# Patient Record
Sex: MALE | Race: WHITE | NOT HISPANIC OR LATINO
[De-identification: names, ages, dates, MRNs, and addresses within clinical notes are randomized per-mention and may not be internally consistent; named-entity substitution may affect disease eponyms.]

---

## 2018-03-27 ENCOUNTER — RECORDS - HEALTHEAST (OUTPATIENT)
Dept: ADMINISTRATIVE | Facility: OTHER | Age: 48
End: 2018-03-27

## 2018-04-24 ENCOUNTER — HOSPITAL ENCOUNTER (OUTPATIENT)
Dept: RESPIRATORY THERAPY | Facility: CLINIC | Age: 48
Discharge: HOME | End: 2018-04-24

## 2018-04-30 ENCOUNTER — HOSPITAL ENCOUNTER (OUTPATIENT)
Dept: RESPIRATORY THERAPY | Facility: CLINIC | Age: 48
Discharge: HOME | End: 2018-04-30

## 2018-04-30 DIAGNOSIS — J44.9 OBSTRUCTIVE LUNG DISEASE (H): ICD-10-CM

## 2018-04-30 LAB — HGB BLD-MCNC: 15.9 G/DL (ref 14–18)

## 2019-07-16 ENCOUNTER — OFFICE VISIT - HEALTHEAST (OUTPATIENT)
Dept: FAMILY MEDICINE | Facility: CLINIC | Age: 49
End: 2019-07-16

## 2019-07-16 DIAGNOSIS — L03.119 CELLULITIS AND ABSCESS OF LEG: ICD-10-CM

## 2019-07-16 DIAGNOSIS — L02.419 CELLULITIS AND ABSCESS OF LEG: ICD-10-CM

## 2019-08-02 ENCOUNTER — COMMUNICATION - HEALTHEAST (OUTPATIENT)
Dept: SCHEDULING | Facility: CLINIC | Age: 49
End: 2019-08-02

## 2019-08-05 ENCOUNTER — OFFICE VISIT - HEALTHEAST (OUTPATIENT)
Dept: FAMILY MEDICINE | Facility: CLINIC | Age: 49
End: 2019-08-05

## 2019-08-05 DIAGNOSIS — L03.115 CELLULITIS OF RIGHT LOWER EXTREMITY: ICD-10-CM

## 2019-08-05 DIAGNOSIS — Z12.11 SCREEN FOR COLON CANCER: ICD-10-CM

## 2019-08-05 DIAGNOSIS — Z72.0 TOBACCO USE: ICD-10-CM

## 2019-08-05 DIAGNOSIS — F41.9 ANXIETY: ICD-10-CM

## 2019-08-05 ASSESSMENT — MIFFLIN-ST. JEOR: SCORE: 2067.47

## 2019-09-23 ENCOUNTER — COMMUNICATION - HEALTHEAST (OUTPATIENT)
Dept: SCHEDULING | Facility: CLINIC | Age: 49
End: 2019-09-23

## 2019-09-23 DIAGNOSIS — L03.115 CELLULITIS OF RIGHT LOWER EXTREMITY: ICD-10-CM

## 2019-10-01 ENCOUNTER — OFFICE VISIT - HEALTHEAST (OUTPATIENT)
Dept: FAMILY MEDICINE | Facility: CLINIC | Age: 49
End: 2019-10-01

## 2019-10-01 ENCOUNTER — COMMUNICATION - HEALTHEAST (OUTPATIENT)
Dept: FAMILY MEDICINE | Facility: CLINIC | Age: 49
End: 2019-10-01

## 2019-10-01 DIAGNOSIS — R06.2 WHEEZING: ICD-10-CM

## 2019-10-01 DIAGNOSIS — L03.115 CELLULITIS OF RIGHT LOWER EXTREMITY: ICD-10-CM

## 2019-10-01 DIAGNOSIS — Z23 NEED FOR TETANUS BOOSTER: ICD-10-CM

## 2019-10-01 DIAGNOSIS — F41.1 GENERALIZED ANXIETY DISORDER: ICD-10-CM

## 2019-10-01 LAB — WBC: 7.2 THOU/UL (ref 4–11)

## 2019-10-01 ASSESSMENT — PATIENT HEALTH QUESTIONNAIRE - PHQ9: SUM OF ALL RESPONSES TO PHQ QUESTIONS 1-9: 20

## 2019-10-01 ASSESSMENT — MIFFLIN-ST. JEOR: SCORE: 2052.04

## 2019-10-01 ASSESSMENT — ANXIETY QUESTIONNAIRES
1. FEELING NERVOUS, ANXIOUS, OR ON EDGE: NEARLY EVERY DAY
GAD7 TOTAL SCORE: 21
5. BEING SO RESTLESS THAT IT IS HARD TO SIT STILL: NEARLY EVERY DAY
4. TROUBLE RELAXING: NEARLY EVERY DAY
6. BECOMING EASILY ANNOYED OR IRRITABLE: NEARLY EVERY DAY
IF YOU CHECKED OFF ANY PROBLEMS ON THIS QUESTIONNAIRE, HOW DIFFICULT HAVE THESE PROBLEMS MADE IT FOR YOU TO DO YOUR WORK, TAKE CARE OF THINGS AT HOME, OR GET ALONG WITH OTHER PEOPLE: VERY DIFFICULT
3. WORRYING TOO MUCH ABOUT DIFFERENT THINGS: NEARLY EVERY DAY
7. FEELING AFRAID AS IF SOMETHING AWFUL MIGHT HAPPEN: NEARLY EVERY DAY
2. NOT BEING ABLE TO STOP OR CONTROL WORRYING: NEARLY EVERY DAY

## 2020-02-19 ENCOUNTER — COMMUNICATION - HEALTHEAST (OUTPATIENT)
Dept: ADMINISTRATIVE | Facility: CLINIC | Age: 50
End: 2020-02-19

## 2021-05-26 ASSESSMENT — PATIENT HEALTH QUESTIONNAIRE - PHQ9: SUM OF ALL RESPONSES TO PHQ QUESTIONS 1-9: 20

## 2021-05-28 ASSESSMENT — ANXIETY QUESTIONNAIRES: GAD7 TOTAL SCORE: 21

## 2021-05-31 NOTE — PROGRESS NOTES
1. Cellulitis of right lower extremity    This seems to be healing up nicely with the antibiotics that he was given.  It does seem that he has a bit of a propensity to this, so he will keep an eye on this and if it happens again he should come in sooner in the course of this so that he can get him on treatment earlier.  Otherwise he should do just fine with this.  He will let us know if it seems to be getting worse instead of better.    2. Anxiety    We spent much more over time today discussing this issue as it seems to be much more important to him.  He is open to trying some medication for this so we will try to do something that can help both of his issues with his anxiety as well as his tobacco use.  He will try some bupropion at 150 mg a day.  I gave him enough to get him through a couple of months here.  I like to see him back in about 4 weeks to make sure that he is improving and make sure he is not having any side effects or issues with it.  We can recheck his PHQ 9 and joselito 7 scores as well at that time.  He denies being suicidal or homicidal but he will let us know if that seems to come on or get worse.    - buPROPion (WELLBUTRIN XL) 150 MG 24 hr tablet; Take 1 tablet (150 mg total) by mouth daily.  Dispense: 90 tablet; Refill: 3    3. Tobacco use    See above, but hopefully the bupropion will also help with this issue and get him to at least cut down on his smoking if not stop altogether.    4. Screen for colon cancer    - Ambulatory referral for Colonoscopy      Subjective: 48-year-old male new patient to our clinic who is here today for an ER follow-up.  Please see the note from the ER from last week.  He was seen for cellulitis of the right shin.  He was given some IM antibiotics as well as some oral antibiotics to go home on.  He has done well, the swelling is gone down, and the redness is improving as well.  The skin is actually starting to shrink down a little bit and peel which is probably a good  sign.  He has had this couple of times in the past.  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    However, most of the time today we discussed his issues of anxiety and depression which became very obvious just in talking with him.  I did have him fill out the questionnaires and he has significantly high scores on both.  It seems that his anxiety may be a bit worse than his depression as he spends a lot of time ruminating and he does not sleep well because he is thinking about things that are going on in his life.  He states he is always been a bit of an anxious person but it is actually become more disabling in the last year or so not doing the things that he enjoys doing because of its anxiety and it really just seems to be taking over his life a bit.  He is not really been on any medication for this and is not seeking any counseling at this time.  He denies being suicidal at all.  He denies any delusions or hallucinations.    Patient is new patient to the clinic. Please see past medical history, surgical history, social history and family history, all of which were completed in their entirety today.    A comprehensive Review of Systems was performed, and other than the positives mentioned above, the ROS was negative.    Objective: Well-appearing male in no acute distress.  Vital signs as noted.  He does seem to be anxious here today.  Ears are clear eyes and nose are normal.  Chest clear to auscultation heart regular rate and rhythm.  The shin on the right leg is good which is a small residual area of dull redness but the calf is supple and in good color with no obvious sign of DVT.  The skin is peeling as if the leg is shrinking down to normal size.

## 2021-05-31 NOTE — TELEPHONE ENCOUNTER
RN triage   Call from pt girlfriend -- no signed consent in chart --   Spoke w/ pt -- pt states he is calling back to ED -- was there last month for leg cellulitis -- and saw missed calls on [facility] phone [facility] --   Pt does not know who called him or what he was called about --  No record in chart of call/message left   Pt thought maybe about lab results-- lab results from ED visit are not released   Update re: cellulitis --   Pt still taking keflex and doxycycline -- did miss some doses and should complete both meds in the next 2 days   Pt states leg is much better -- and skin is peeling now  Leg will still swell some when standing too long -- swell goes down overnight   Still some redness on leg --- no fever   Pt states he has a number of health concerns --   Pt will keep appt scheduled 8/6 -- for ED f/u-- and discuss concerns w/ provider and pt will consider establishing a PCP to f/u on health concerns   Fátima Chapa RN [facility] RN triage      Reason for Disposition    Information only question and nurse able to answer    Protocols used: NO PROTOCOL AVAILABLE - INFORMATION ONLY-A-OH

## 2021-06-01 NOTE — TELEPHONE ENCOUNTER
We start at the dose that you were on before, which was 150.    In a month, if you aren't fully better, we will put it up to 300 then.    TS

## 2021-06-01 NOTE — TELEPHONE ENCOUNTER
Question following Office Visit  When did you see your provider: today  What is your question: I have questions regarding the following medication:  buPROPion (WELLBUTRIN XL) 150 MG 24 hr tablet 90 tablet 3 10/1/2019     Sig - Route: Take 1 tablet (150 mg total) by mouth daily. - Oral    Sent to pharmacy as: buPROPion HCl  mg 24 hr tablet, extended release (WELLBUTRIN XL      The pharmacy stated its to soon to fill this but I thought the dose was going to be increased. Please return call to discuss with patient.  Okay to leave a detailed message: Yes

## 2021-06-01 NOTE — PROGRESS NOTES
"ASSESSMENT:  1. Cellulitis of right lower extremity    These antibiotic that he is already started for this is doing quite well, as he states the cellulitis is improved, the color is better and the swelling is gone down.  His white count is also normal.  I think that this will continue to get better and he will continue to take the antibiotics until they are gone and follow-up if is not in coming back.    - White Blood Count (WBC)  - [facility] RED    2. Generalized anxiety disorder    I think the rest of his symptoms really are more associated with his anxiety.  He certainly does have some wheezing walking but we talked about anxiety today for quite a long time.  His girlfriend is here with and is very concerned about him as far as anxiety and depression go we did do a joselito 7 score which was very high.  He stopped taking the bupropion because it \"did not work\", but it turns out it only took it for a couple of days and then gave up on it.  We did discuss how the medication works and how he needs to take it for the time prescribed and that he should not see any effect of heart at all for a couple of weeks.  I restarted the mid to 150 mg a day that we can see him back in a month and see how that is working for him he may need to go up to 300 mg a day if it is not effective.    We also discussed how this could possibly help his smoking and that would be a good thing as well.      - buPROPion (WELLBUTRIN XL) 150 MG 24 hr tablet; Take 1 tablet (150 mg total) by mouth daily.  Dispense: 90 tablet; Refill: 3    3. Wheezing    I did give him an albuterol inhaler with instructions on its usage.  He should try to use it with a spacer when he can.  He should also quit smoking which is obvious.  He is at a hard time with this and he did not tolerate the Chantix that was tried a while ago.  I would imagine that the albuterol will help him but he will need to quit smoking and so that he does not have to use it too much.  We will follow-up " with this also in a month to see how that is working out for him.      - albuterol (PROAIR HFA;PROVENTIL HFA;VENTOLIN HFA) 90 mcg/actuation inhaler; Inhale 2 puffs every 6 (six) hours as needed for wheezing.  Dispense: 18 g; Refill: 5    4. Need for tetanus booster    - Td, Preservative Free (green label)          PLAN:  There are no Patient Instructions on file for this visit.    Orders Placed This Encounter   Procedures     Td, Preservative Free (green label)     White Blood Count (WBC)     [facility] RED     Medications Discontinued During This Encounter   Medication Reason     buPROPion (WELLBUTRIN XL) 150 MG 24 hr tablet Reorder       No follow-ups on file.    CHIEF COMPLAINT:  Chief Complaint   Patient presents with     Cough     Feels very weak last couple days, Productive cough, weak, nose plugged last 2 years, Congested, Has cellulitis on Rt shin       HISTORY OF PRESENT ILLNESS:  Rio is a 48 y.o. male presenting to the clinic today for a follow-up.  He did see me once before with this recurrent cellulitis in his lower extremity.  We did call in an antibiotic for him last week when it seemed to be acting up and that is doing actually much better.  He reports that the cellulitis is gone down and the color is better and overall the swelling is down and feels much better.    Unfortunately, he has a collection no other symptoms which I think are most likely related to smoking and/or anxiety.  He states that over the last couple of days he has had more headache and body aches and is not feeling well.  He will get dizzy at times.  He also is concerned because of shortness of breath that he has had with activity but he does not get any chest pain or anginal type equivalent symptoms from it.  He thinks is from his smoking but he is very anxious and nervous that there is something else going on as well giving him the symptoms.  He has had some sinus pressure and nasal drainage down the back of his throat.  He has had  "no fevers or chills as mentioned.    He was on some Wellbutrin but again as above, he stopped it after a couple of days because I did not think that it was working.  He is willing to go back on it at this time however.    He also tried Chantix at one point but did not tolerate it because it made him go \"crazy\".    REVIEW OF SYSTEMS:     All other systems are negative.    PFSH:    Reviewed      TOBACCO USE:  Social History     Tobacco Use   Smoking Status Current Every Day Smoker     Packs/day: 1.50     Years: 35.00     Pack years: 52.50   Smokeless Tobacco Never Used       VITALS:  Vitals:    10/01/19 1127   BP: 100/76   Patient Site: Left Arm   Patient Position: Sitting   Cuff Size: Adult Large   Pulse: 98   Temp: 98.1  F (36.7  C)   SpO2: 97%   Weight: (!) 264 lb 8 oz (120 kg)   Height: 5' 9.75\" (1.772 m)     Wt Readings from Last 3 Encounters:   10/01/19 (!) 264 lb 8 oz (120 kg)   08/05/19 (!) 267 lb 14.4 oz (121.5 kg)   07/17/19 (!) 262 lb 11.2 oz (119.2 kg)     Body mass index is 38.22 kg/m .    PHYSICAL EXAM:  Constitutional: Anxious appearing patient in no acute distress.  Vitals:  Per nursing notes.    HEENT:  Normocephalic, atraumatic.  Ears are clear bilaterally, with no fluid or redness, and landmarks visible.  Pupils are equal and reactive to light, extraocular muscles intact, visual fields are full.  Nose is normal, and oropharynx is clear without redness.    Neck is without lymphadenopathy.    Lungs:  Clear show some wheezing bilaterally but no rales or rhonchi heard.  Cardiac:  Regular rate and rhythm without murmurs, rubs, or gallops.     Leg on the right shows healing cellulitis with good color..  Palpation of the distal pulses are normal and symmetric.    Neurologic:  Cranial nerves II-XII intact.   Normal and symmetric reflexes in extremities, with normal strength and sensation.  Psychiatric: He is very anxious today and is very tangential in his thoughts and somewhat negative.    How difficult " did these problems make it for you to do your work, take care of things at home or get along with other people? : Very difficult (10/1/2019 12:00 PM)  Feeling nervous, anxious, or on edge: 3 (10/1/2019 12:00 PM)  Not being able to stop or control worrying: 3 (10/1/2019 12:00 PM)  Worrying too much about different things: 3 (10/1/2019 12:00 PM)  Trouble relaxing: 3 (10/1/2019 12:00 PM)  Being so restless that it's hard to sit still: 3 (10/1/2019 12:00 PM)  Becoming easily annoyed or irritable: 3 (10/1/2019 12:00 PM)  Feeling afraid as if something awful might happen: 3 (10/1/2019 12:00 PM)  MELISA 7 Total Score: 21 (10/1/2019 12:00 PM)  How difficult did these problems make it for you to do your work, take care of things at home or get along with other people? : Very difficult (10/1/2019 12:00 PM)    Little interest or pleasure in doing things: Nearly every day  Feeling down, depressed, or hopeless: Nearly every day  Trouble falling or staying asleep, or sleeping too much: Nearly every day  Feeling tired or having little energy: Nearly every day  Poor appetite or overeating: Nearly every day  Feeling bad about yourself - or that you are a failure or have let yourself or your family down: Nearly every day  Trouble concentrating on things, such as reading the newspaper or watching television: More than half the days  Moving or speaking so slowly that other people could have noticed. Or the opposite - being so fidgety or restless that you have been moving around a lot more than usual: Not at all  Thoughts that you would be better off dead, or of hurting yourself in some way: Not at all  PHQ-9 Total Score: 20  If you checked off any problems, how difficult have these problems made it for you to do your work, take care of things at home, or get along with other people?: Very difficult          The visit lasted a total of 25 minutes face to face with the patient. Over 50% of the time was spent counseling and educating the  patient about medications, medication adjustments, medication side effects, and lab testing.        MEDICATIONS:  Current Outpatient Medications   Medication Sig Dispense Refill     acetaminophen (TYLENOL) 500 MG tablet Take 1,000 mg by mouth every morning.       cephalexin (KEFLEX) 500 MG capsule Take 1 capsule (500 mg total) by mouth 4 (four) times a day for 10 days. 40 capsule 0     clotrimazole (LOTRIMIN AF, CLOTRIMAZOLE,) 1 % cream Apply topically 2 (two) times a day. Apply to feet. 30 g 0     albuterol (PROAIR HFA;PROVENTIL HFA;VENTOLIN HFA) 90 mcg/actuation inhaler Inhale 2 puffs every 6 (six) hours as needed for wheezing. 18 g 5     buPROPion (WELLBUTRIN XL) 150 MG 24 hr tablet Take 1 tablet (150 mg total) by mouth daily. 90 tablet 3     No current facility-administered medications for this visit.

## 2021-06-01 NOTE — TELEPHONE ENCOUNTER
Called Rio that antibiotic was sent to the pharmacy.  Rio is wondering what to do about work.  He said he is miserable, bones ache and area is spreading on shin.  At what point is it okay to go back to work. Oly Hodges LPN

## 2021-06-01 NOTE — TELEPHONE ENCOUNTER
Patient Returning Call  Reason for call:  Status of message left this morning.  Information relayed to patient:  Advised the patient the message is open,, and would put notice of callback on original message.Patient is anxious to hear back  please.  Patient has additional questions:  No  If YES, what are your questions/concerns:  NA  Okay to leave a detailed message?: Yes

## 2021-06-01 NOTE — TELEPHONE ENCOUNTER
"Girlfriend  - Verena     OK to talk to her         CC:  \"Cellulitis is back\"      R shin  - similar as before       Started this am      \"Chills\" - unsure if ever      R shin just red - no swelling     Not above the knee  - nothing to the calf area       At home is taking IBU for pain / achiness        Most recent note     \"Cellulitis of right lower extremity     This seems to be healing up nicely with the antibiotics that he was given.  It does seem that he has a bit of a propensity to this, so he will keep an eye on this and if it happens again he should come in sooner in the course of this so that he can get him on treatment earlier.  Otherwise he should do just fine with this.  He will let us know if it seems to be getting worse instead of better.\"       A/P:   > I will message provider to see about ABX vs appt for him      Confirmed pharmacy on file   [facility]   Pt tele# [phone]      Alvarado Duarte, RN   Triage and Medication Refills        Reason for Disposition    Fever and localized rash is very painful    Protocols used: RASH OR REDNESS - [id number]      "

## 2021-06-01 NOTE — TELEPHONE ENCOUNTER
Called advise to Rio on waiting to go to work when he is feeling better and not having fever and chills.  Oly Hodges LPN

## 2021-06-03 VITALS
WEIGHT: 264.5 LBS | HEIGHT: 70 IN | BODY MASS INDEX: 37.87 KG/M2 | HEART RATE: 98 BPM | TEMPERATURE: 98.1 F | SYSTOLIC BLOOD PRESSURE: 100 MMHG | DIASTOLIC BLOOD PRESSURE: 76 MMHG | OXYGEN SATURATION: 97 %

## 2021-06-03 VITALS — WEIGHT: 267.9 LBS | HEIGHT: 70 IN | BODY MASS INDEX: 38.35 KG/M2

## 2021-06-03 VITALS — WEIGHT: 261.2 LBS | BODY MASS INDEX: 36.43 KG/M2

## 2021-06-17 NOTE — PROGRESS NOTES
RESPIRATORY CARE NOTE     Patient Name: Rio Paul  Today's Date: 4/30/2018     Complete PFT done. Pt performed tests with good effort. Test results meet ATS criteria. Results scanned into epic. Pt left in no distress.       Mare Steinberg RRT

## 2021-06-17 NOTE — PATIENT INSTRUCTIONS - HE
Patient Instructions by Chris Coulter PA-C at 7/16/2019  1:30 PM     Author: Chris Coulter PA-C Service: -- Author Type: Physician Assistant    Filed: 7/16/2019  2:00 PM Encounter Date: 7/16/2019 Status: Addendum    : Chris Coulter PA-C (Physician Assistant)    Related Notes: Original Note by Chris Coulter PA-C (Physician Assistant) filed at 7/16/2019  2:00 PM       Present to the emergency room for definitive evaluation and treatment.  There they may treat you with IV antibiotics and further evaluation.      Patient Education     Cellulitis  Cellulitis is an infection of the deep layers of skin. A break in the skin, such as a cut or scratch, can let bacteria under the skin. If the bacteria get to deep layers of the skin, it can be serious. If not treated, cellulitis can get into the bloodstream and lymph nodes. The infection can then spread throughout the body. This causes serious illness.  Cellulitis causes the affected skin to become red, swollen, warm, and sore. The reddened areas have a visible border. An open sore may leak fluid (pus). You may have a fever, chills, and pain.  Cellulitis is treated with antibiotics taken for 7 to 10 days. An open sore may be cleaned and covered with cool wet gauze. Symptoms should get better 1 to 2 days after treatment is started. Make sure to take all the antibiotics for the full number of days until they are gone. Keep taking the medicine even if your symptoms go away.  Home care  Follow these tips:    Limit the use of the part of your body with cellulitis.     If the infection is on your leg, keep your leg raised while sitting. This will help to reduce swelling.    Take all of the antibiotic medicine exactly as directed until it is gone. Do not miss any doses, especially during the first 7 days. Dont stop taking the medicine when your symptoms get better.    Keep the affected area clean and dry.    Wash your hands with soap and warm water before and after touching your  skin. Anyone else who touches your skin should also wash his or her hands. Don't share towels.  Follow-up care  Follow up with your healthcare provider, or as advised. If your infection does not go away on the first antibiotic, your healthcare provider will prescribe a different one.  When to seek medical advice  Call your healthcare provider right away if any of these occur:    Red areas that spread    Swelling or pain that gets worse    Fluid leaking from the skin (pus)    Fever higher of 100.4  F (38.0  C) or higher after 2 days on antibiotics  Date Last Reviewed: 9/1/2016 2000-2017 [employer]. [address] [place] [address]. All rights reserved. This information is not intended as a substitute for professional medical care. Always follow your healthcare professional's instructions.           Patient Education     Discharge Instructions for Cellulitis  You have been diagnosed with cellulitis. This is an infection in the deepest layer of the skin. In some cases, the infection also affects the muscle. Cellulitis is caused by bacteria. The bacteria can enter the body through broken skin. This can happen with a cut, scratch, animal bite, or an insect bite that has been scratched. You may have been treated in the hospital with antibiotics and fluids. You will likely be given a prescription for antibiotics to take at home. This sheet will help you take care of yourself at home.  Home care  When you are home:    Take the prescribed antibiotic medicine you are given as directed until it is gone. Take it even if you feel better. It treats the infection and stops it from returning. Not taking all the medicine can make future infections hard to treat.    Keep the infected area clean.    When possible, raise the infected area above the level of your heart. This helps keep swelling down.    Talk with your healthcare provider if you are in pain. Ask what kind of over-the-counter medicine you can  take for pain.    Apply clean bandages as advised.    Take your temperature once a day for a week.    Wash your hands often to prevent spreading the infection.  In the future, wash your hands before and after you touch cuts, scratches, or bandages. This will help prevent infection.   When to call your healthcare provider  Call your healthcare provider immediately if you have any of the following:    Difficulty or pain when moving the joints above or below the infected area    Discharge or pus draining from the area    Fever of 100.4 F (38 C) or higher, or as directed by your healthcare provider    Pain that gets worse in or around the infected     Redness that gets worse in or around the infected area, particularly if the area of redness expands to a wider area    Shaking chills    Swelling of the infected area    Vomiting   Date Last Reviewed: 8/1/2016 2000-2017 [employer]. [address] [place] [address], [place] [address]. All rights reserved. This information is not intended as a substitute for professional medical care. Always follow your healthcare professional's instructions.

## 2021-06-20 NOTE — LETTER
Letter by Bridget Alejandro at      Author: Bridget Alejandro Service: -- Author Type: --    Filed:  Encounter Date: 2/19/2020 Status: (Other)         Rio Paul  [address]  [facility] [address]           02/19/20       Dear Rio:      At [facility], we care about your health and well-being.  Your primary care provider is committed to ensuring you receive high quality care and has chosen a network of specialists to assist in providing that care.  Recently Dr. Gabriel Veliz referred you to [facility] for a screening colonoscopy.    It is important to your overall health to follow through with the referral from your care provider.  Please call [facility] [phone] at your earliest convenience for assistance in scheduling an appointment with the recommended specialist.  If you have already scheduled an appointment, please disregard this notice.  Thank you for choosing the [facility] for your healthcare needs.      Sincerely,        Electronically signed by Bridget Alejandro      Scheduling/Referral Coordinator   [facility]

## 2021-06-27 NOTE — PROGRESS NOTES
Progress Notes by Chris Coulter PA-C at 7/16/2019  1:30 PM     Author: Chris Coulter PA-C Service: -- Author Type: Physician Assistant    Filed: 7/16/2019  5:01 PM Encounter Date: 7/16/2019 Status: Signed    : Chris Coulter PA-C (Physician Assistant)       Subjective:      Patient ID: Rio Paul is a 48 y.o. male.    Chief Complaint:    HPI  Rio Paul is a 48 y.o. male who who smokes a 1-1/2 to 2 packs/day who presents today complaining of an infection of the right pretibial area of his right lower extremity.  Patient recounts past medical history for having injured the area while cleaning his garage.  He does not complain of pain into the bone or an injury to the bone but rather a small bump or neck to the skin that caused some redness that started yesterday.  Over the last 48 hours the area of redness has increased markedly.  Patient shares last night that he had fever chills night sweats and fatigue.  He was having difficulty with staying warm last night.  He denies any immunocompromise status to include no type 2 diabetes.,  No alcohol use no kidney disease.    Patient has not tried any treatment for this at home.    History reviewed. No pertinent past medical history.    History reviewed. No pertinent surgical history.    No family history on file.    Social History     Tobacco Use   ? Smoking status: Current Every Day Smoker     Packs/day: 1.50     Years: 35.00     Pack years: 52.50   ? Smokeless tobacco: Never Used   Substance Use Topics   ? Alcohol use: No   ? Drug use: Not on file       Review of Systems  As above in HPI, otherwise balance of Review of Systems are negative.    Objective:     /70 (Patient Site: Right Arm, Patient Position: Sitting, Cuff Size: Adult Large)   Pulse (!) 113   Temp 99.8  F (37.7  C) (Oral)   Resp 24   Wt (!) 261 lb 3.2 oz (118.5 kg)   SpO2 97%   BMI 36.43 kg/m      Physical Exam  General: Patient is resting comfortably no acute distress is  afebrile  HEENT: Head is normocephalic atraumatic   eyes are PERRL EOMI sclera anicteric   LUNGS: Clear to auscultation bilaterally tachypneic at 24 respirations per minute  HEART: Tachycardic at 113 bpm   Skin: Without rash non-diaphoretic  Musculoskeletal: On the right lower pretibial area the outline of the cellulitis was marked.  He has a 21 cm area of diameter of erythema.  It is warm to touch and tender to palpation.      Assessment:     Procedures    The encounter diagnosis was Cellulitis and abscess of leg.    Plan:     1. Cellulitis and abscess of leg         I am concerned that the patient had systemic symptoms to include fever chills and night sweats last night.  He has fatigue today.  He is tachycardic and tachypneic so I am sending him to the emergency room for evaluation and treatment.  He may need IV antibiotics and some observation to make sure he is not septic.  Patient will have his wife drive him the short distance to the emergency room.  I called and gave report to the emergency room physician.  Questions were answered to patient's satisfaction before discharge.    Patient Instructions     Present to the emergency room for definitive evaluation and treatment.  There they may treat you with IV antibiotics and further evaluation.      Patient Education     Cellulitis  Cellulitis is an infection of the deep layers of skin. A break in the skin, such as a cut or scratch, can let bacteria under the skin. If the bacteria get to deep layers of the skin, it can be serious. If not treated, cellulitis can get into the bloodstream and lymph nodes. The infection can then spread throughout the body. This causes serious illness.  Cellulitis causes the affected skin to become red, swollen, warm, and sore. The reddened areas have a visible border. An open sore may leak fluid (pus). You may have a fever, chills, and pain.  Cellulitis is treated with antibiotics taken for 7 to 10 days. An open sore may be cleaned  and covered with cool wet gauze. Symptoms should get better 1 to 2 days after treatment is started. Make sure to take all the antibiotics for the full number of days until they are gone. Keep taking the medicine even if your symptoms go away.  Home care  Follow these tips:    Limit the use of the part of your body with cellulitis.     If the infection is on your leg, keep your leg raised while sitting. This will help to reduce swelling.    Take all of the antibiotic medicine exactly as directed until it is gone. Do not miss any doses, especially during the first 7 days. Dont stop taking the medicine when your symptoms get better.    Keep the affected area clean and dry.    Wash your hands with soap and warm water before and after touching your skin. Anyone else who touches your skin should also wash his or her hands. Don't share towels.  Follow-up care  Follow up with your healthcare provider, or as advised. If your infection does not go away on the first antibiotic, your healthcare provider will prescribe a different one.  When to seek medical advice  Call your healthcare provider right away if any of these occur:    Red areas that spread    Swelling or pain that gets worse    Fluid leaking from the skin (pus)    Fever higher of 100.4  F (38.0  C) or higher after 2 days on antibiotics  Date Last Reviewed: 9/1/2016 2000-2017 [employer]. [address] [place] [address], [place] [address]. All rights reserved. This information is not intended as a substitute for professional medical care. Always follow your healthcare professional's instructions.           Patient Education     Discharge Instructions for Cellulitis  You have been diagnosed with cellulitis. This is an infection in the deepest layer of the skin. In some cases, the infection also affects the muscle. Cellulitis is caused by bacteria. The bacteria can enter the body through broken skin. This can happen with a cut, scratch, animal bite, or an  insect bite that has been scratched. You may have been treated in the hospital with antibiotics and fluids. You will likely be given a prescription for antibiotics to take at home. This sheet will help you take care of yourself at home.  Home care  When you are home:    Take the prescribed antibiotic medicine you are given as directed until it is gone. Take it even if you feel better. It treats the infection and stops it from returning. Not taking all the medicine can make future infections hard to treat.    Keep the infected area clean.    When possible, raise the infected area above the level of your heart. This helps keep swelling down.    Talk with your healthcare provider if you are in pain. Ask what kind of over-the-counter medicine you can take for pain.    Apply clean bandages as advised.    Take your temperature once a day for a week.    Wash your hands often to prevent spreading the infection.  In the future, wash your hands before and after you touch cuts, scratches, or bandages. This will help prevent infection.   When to call your healthcare provider  Call your healthcare provider immediately if you have any of the following:    Difficulty or pain when moving the joints above or below the infected area    Discharge or pus draining from the area    Fever of 100.4 F (38 C) or higher, or as directed by your healthcare provider    Pain that gets worse in or around the infected     Redness that gets worse in or around the infected area, particularly if the area of redness expands to a wider area    Shaking chills    Swelling of the infected area    Vomiting   Date Last Reviewed: 8/1/2016 2000-2017 [employer]. [address] [place] [address], [place] [address]. All rights reserved. This information is not intended as a substitute for professional medical care. Always follow your healthcare professional's instructions.

## 2025-06-18 ENCOUNTER — NURSE TRIAGE (OUTPATIENT)
Dept: FAMILY MEDICINE | Facility: CLINIC | Age: 55
End: 2025-06-18

## 2025-06-18 NOTE — TELEPHONE ENCOUNTER
"Reason for Disposition   MODERATE pain (e.g., interferes with normal activities) and present > 3 days    Additional Information   Negative: Shock suspected (e.g., cold/pale/clammy skin, too weak to stand, low BP, rapid pulse)   Negative: Similar pain previously and it was from 'heart attack'   Negative: Similar pain previously and it was from 'angina' and not relieved by nitroglycerin   Negative: Sounds like a life-threatening emergency to the triager   Negative: Chest pain   Negative: Followed an injury to shoulder   Negative: Difficulty breathing or unusual sweating (e.g., sweating without exertion)   Negative: Pain lasting > 5 minutes and pain also present in chest  (Exception: Pain is clearly made worse by movement.)   Negative: Age > 40 and no obvious cause and pain even when not moving the arm  (Exception: Pain is clearly made worse by moving arm or bending neck.)   Negative: Red area or streak and fever   Negative: Swollen joint and fever   Negative: Entire arm is swollen   Negative: Patient sounds very sick or weak to the triager   Negative: Painful rash with multiple small blisters grouped together (i.e., dermatomal distribution or 'band' or 'stripe')   Negative: Looks like a boil, infected sore, deep ulcer or other infected rash (spreading redness, pus)   Negative: Localized rash is very painful (no fever)   Negative: Weakness (i.e., loss of strength) in hand or fingers  (Exception: Not truly weak; hand feels weak because of pain.)   Negative: Numbness (i.e., loss of sensation) in hand or fingers   Negative: Unable to use arm at all and because of shoulder pain or stiffness    Answer Assessment - Initial Assessment Questions  1. ONSET: \"When did the pain start?\"      2 weeks   2. LOCATION: \"Where is the pain located?\"      Right shoulder   3. PAIN: \"How bad is the pain?\" (Scale 1-10; or mild, moderate, severe)      Moderate, 6/10   4. WORK OR EXERCISE: \"Has there been any recent work or exercise that " "involved this part of the body?\"      Could be work related, does do repetitive work   5. CAUSE: \"What do you think is causing the shoulder pain?\"      Unsure - saw on site RN with employer \"she thought is was tendonitis\"  6. OTHER SYMPTOMS: \"Do you have any other symptoms?\" (e.g., neck pain, swelling, rash, fever, numbness, weakness)      None -  not too bad if not moving arm.  Sever with movement from shoulder to elbow.   7. PREGNANCY: \"Is there any chance you are pregnant?\" \"When was your last menstrual period?\"      NA    Protocols used: Shoulder Pain-A-[place]    "